# Patient Record
(demographics unavailable — no encounter records)

---

## 2024-10-10 NOTE — REASON FOR VISIT
[Post Op: _________] : a [unfilled] post op visit [Family Member] : family member [FreeTextEntry1] : DATE OF OPERATION:  10/03/2024 PROCEDURE:    Closure of nasomaxillary bony exposure after debridement  Closure of right nasomaxillary soft-tissue defect.  Full-thickness Nasal reconstruction with right alar transposition, skin turnover flap for nasal lining, full-thickness skin graft from right postauricular region to nose for skin coverage  Cheek advancement flap. (5cm x 8cm)

## 2024-10-20 NOTE — REASON FOR VISIT
[Post Op: _________] : a [unfilled] post op visit [Family Member] : family member [FreeTextEntry1] : Skin graft from Right ear.

## 2024-10-20 NOTE — DISCUSSION/SUMMARY
[FreeTextEntry1] : S/P cheek and nasal reconstrucion nonhealing and trauma after surgery nasal turnover flap dehised and kassidy right alar tissue detatched must redo the procedure in OR including flaps and FTSG  Swelling and eccymosis are still present  Advised to sleep with head at 45 degrees to reduce swelling; Advance to regular diet; Resume normal activities;  Take Motrin 600mg with food every 6 hours; Follow up in one week. plan OR revision

## 2024-10-29 NOTE — REASON FOR VISIT
[Post Op: _________] : a [unfilled] post op visit [Family Member] : family member [FreeTextEntry1] : DOS 10/24/24 PROCEDURES:   Right nasal turnover flap.  Reposition of right alar composite graft.  Full-thickness skin graft from left posterior ear to right nasal sidewall with bolster.

## 2024-11-04 NOTE — HISTORY OF PRESENT ILLNESS
[FreeTextEntry1] :  OPERATION: 10/24/2024 PROCEDURES: - Right nasal turnover flap.  -Reposition of right alar composite graft.  -Full-thickness skin graft from left posterior ear to right nasal sidewall with bolster.   Patient accompanied by family member.

## 2024-11-11 NOTE — REASON FOR VISIT
[Post Op: _________] : a [unfilled] post op visit [FreeTextEntry1] : OPERATION: 10/24/2024 PROCEDURES: - Right nasal turnover flap. -Reposition of right alar composite graft. -Full-thickness skin graft from left posterior ear to right nasal sidewall with bolster. Patient accompanied by family member.

## 2024-11-23 NOTE — PHYSICAL EXAM
[de-identified] : Alert, calm, cooperative. [de-identified] : + right nasal tissue contraction.  [de-identified] : Respirations even and unlabored.

## 2024-11-23 NOTE — DISCUSSION/SUMMARY
[FreeTextEntry1] : DOTTY is a 79 year old female patient that presents to the office today with her friend for a post operative evaluation s/p Right nasal turnover flap, Reposition of right alar composite graft, Full-thickness skin graft from left posterior ear to right nasal sidewall with bolster on 10/24/2024 and a revision procedure on 11/7/2024. Instructions reviewed and questions/concerns answered. Clean area daily with soap and water or saline solution, then apply bacitracin to incision sites, then apply a layer of xeroform over the incision sites, then reapply a layer of bacitracin. Telfa and gauze with tape can be used to cover the xeroform to help avoid touching region. Dressing instructions will also be emailed to pt's daughter Elana to assist with wound care.  - Sleep with at least 2 pillows to keep head elevated. - Use Ibuprofen for pain relief. - You may shower and air dry or gently pat dry. - May advance diet as tolerated. - Contact us for any questions or concerns. - Follow up in 1 week.

## 2024-11-23 NOTE — REASON FOR VISIT
[Post Op: _________] : a [unfilled] post op visit [Family Member] : family member [Friend] : friend [FreeTextEntry1] : OPERATION: 10/24/2024 PROCEDURES: - Right nasal turnover flap. -Reposition of right alar composite graft. -Full-thickness skin graft from left posterior ear to right nasal sidewall with bolster. Patient accompanied by family member.

## 2024-11-23 NOTE — PHYSICAL EXAM
[de-identified] : Alert, calm, cooperative. [de-identified] : + right nasal tissue contraction.  [de-identified] : Respirations even and unlabored.

## 2024-11-23 NOTE — HISTORY OF PRESENT ILLNESS
[FreeTextEntry1] : DOTTY is a 79 year old female patient that presents to the office today with her friend for a post operative evaluation s/p Right nasal turnover flap, Reposition of right alar composite graft, Full-thickness skin graft from left posterior ear to right nasal sidewall with bolster on 10/24/2024 and a revision procedure on 11/7/2024. Pt's daughter emailed prior to appt with photo updates which showed some wound dehiscence.

## 2024-11-23 NOTE — PHYSICAL EXAM
[de-identified] : Alert, calm, cooperative. [de-identified] : + right nasal tissue contraction.  [de-identified] : Respirations even and unlabored.

## 2024-12-12 NOTE — PHYSICAL EXAM
[de-identified] : Alert, calm, cooperative. [de-identified] : + turndown flap contraction.  [de-identified] : Respirations even and unlabored.

## 2024-12-12 NOTE — DISCUSSION/SUMMARY
[FreeTextEntry1] : DOTTY is a 79 year old female patient that presents to the office today with her friend for a post operative evaluation s/p Right nasal turnover flap, Reposition of right alar composite graft, Full-thickness skin graft from left posterior ear to right nasal sidewall with bolster on 10/24/2024 and a revision procedure on 11/7/2024. Dr. Bell states that it will be best to wait for the tissue to continue to contract. Instructions reviewed and questions/concerns answered.  - Apply bacitracin to wound sites, pt may wear a bandaid to protect the area when leaving home. - Sleep with at least 2 pillows to keep head elevated. - Use Ibuprofen for pain relief. - You may shower and air dry or gently pat dry. - May advance diet as tolerated. - Contact us for any questions or concerns. - Follow up in 6 weeks.

## 2024-12-12 NOTE — PHYSICAL EXAM
[de-identified] : Alert, calm, cooperative. [de-identified] : + turndown flap contraction.  [de-identified] : Respirations even and unlabored.

## 2024-12-12 NOTE — REASON FOR VISIT
[Post Op: _________] : a [unfilled] post op visit [Family Member] : family member [FreeTextEntry1] : Dop: 10/24/2024 PROCEDURES: - Right nasal turnover flap. -Reposition of right alar composite graft. -Full-thickness skin graft from left posterior ear to right nasal sidewall with bolster.

## 2024-12-12 NOTE — HISTORY OF PRESENT ILLNESS
[FreeTextEntry1] : DOTTY is a 79 year old female patient that presents to the office today with her friend for a post operative evaluation s/p Right nasal turnover flap, Reposition of right alar composite graft, Full-thickness skin graft from left posterior ear to right nasal sidewall with bolster on 10/24/2024 and a revision procedure on 11/7/2024. Pt still with soft tissue contraction at turndown flap.

## 2024-12-12 NOTE — PHYSICAL EXAM
[de-identified] : Alert, calm, cooperative. [de-identified] : + turndown flap contraction.  [de-identified] : Respirations even and unlabored.

## 2025-01-16 NOTE — PHYSICAL EXAM
[de-identified] : Alert, calm, cooperative. [de-identified] : + turndown flap contraction.  [de-identified] : Respirations even and unlabored.

## 2025-01-16 NOTE — DISCUSSION/SUMMARY
[FreeTextEntry1] : DOTTY is a 79 year old female patient that presents to the office today with her friend for a post operative evaluation s/p Right nasal turnover flap, Reposition of right alar composite graft, Full-thickness skin graft from left posterior ear to right nasal sidewall with bolster on 10/24/2024 and a revision procedure on 11/7/2024. Dr. Bell states they can operate Instructions reviewed and questions/concerns answered.  - Apply bacitracin to wound sites, pt may wear a bandaid to protect the area when leaving home. - Sleep with at least 2 pillows to keep head elevated. - Use Ibuprofen for pain relief. - You may shower and gently pat dry. - May advance diet as tolerated. - Contact us for any questions or concerns. - Our office will contact you when procedure is approved.  Plan: Right nasal reconstruction

## 2025-01-16 NOTE — REASON FOR VISIT
[Family Member] : family member [FreeTextEntry1] : Dop: 10/24/2024 PROCEDURES: - Right nasal turnover flap. -Reposition of right alar composite graft. -Full-thickness skin graft from left posterior ear to right nasal sidewall with bolster.   Patient accompanied by family member.

## 2025-01-16 NOTE — PHYSICAL EXAM
[de-identified] : Alert, calm, cooperative. [de-identified] : + turndown flap contraction.  [de-identified] : Respirations even and unlabored.

## 2025-01-16 NOTE — PHYSICAL EXAM
[de-identified] : Alert, calm, cooperative. [de-identified] : + turndown flap contraction.  [de-identified] : Respirations even and unlabored.

## 2025-01-23 NOTE — ASSESSMENT
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 She is Q 3 weekly carboplatin and paclitaxel. s/p Cycle 6 on 9/1/20. Repeat imaging revealed persistent heterogeneous tumor is seen along the roof of the maxillary sinus extending into the ethmoid complex. There is persistent tumor extension into the extraconal space of the right orbit inferiorly as well as mild retro-antral tumor extension. s/p CCRT with weekly Cisplatin completed 12/4/21 Pt subsequently had surgery in December 2020 Path CR Undergone reconstructive procedures and needs many more. Completed HBO x 2 times She had some difficulty tolerating it towards Clinically, pt has PETER Reviewed scans from October 2023 independently and note PETER Will get new scans.  Continue with reconstructive procedures Extensive emotional support was provided. OV in 1 year

## 2025-01-23 NOTE — PHYSICAL EXAM
[Restricted in physically strenuous activity but ambulatory and able to carry out work of a light or sedentary nature] : Status 1- Restricted in physically strenuous activity but ambulatory and able to carry out work of a light or sedentary nature, e.g., light house work, office work [Thrush] : thrush [Normal] : affect appropriate [Mucositis] : no mucositis [de-identified] : enophthalmic [de-identified] : facial defect from surgery

## 2025-01-23 NOTE — REVIEW OF SYSTEMS
[Fatigue] : fatigue [Recent Change In Weight] : ~T recent weight change [Vision Problems] : vision problems [Joint Pain] : joint pain [Dizziness] : dizziness [Difficulty Walking] : difficulty walking [Negative] : Psychiatric [Fever] : no fever [Chills] : no chills [Night Sweats] : no night sweats [Eye Pain] : no eye pain [Red Eyes] : eyes not red [Dry Eyes] : no dryness of the eyes [Dysphagia] : no dysphagia [Loss of Hearing] : no loss of hearing [Odynophagia] : no odynophagia [Mucosal Pain] : no mucosal pain [Chest Pain] : no chest pain [Palpitations] : no palpitations [Shortness Of Breath] : no shortness of breath [Wheezing] : no wheezing [Cough] : no cough [Abdominal Pain] : no abdominal pain [Vomiting] : no vomiting [Constipation] : no constipation [Dysuria] : no dysuria [Skin Rash] : no skin rash [Confused] : no confusion [Fainting] : no fainting [Suicidal] : not suicidal [Proptosis] : no proptosis [Easy Bleeding] : no tendency for easy bleeding [FreeTextEntry2] : lost weight [FreeTextEntry3] : rt eye enophthalmic [FreeTextEntry4] : dry mouth, edentulous, new dentures dont fit [FreeTextEntry9] : joint pains stable  [de-identified] : facial deformities above

## 2025-01-23 NOTE — HISTORY OF PRESENT ILLNESS
[Home] : at home, [unfilled] , at the time of the visit. [Medical Office: ([facility])___] : at the medical office located in  [Verbal consent obtained from patient] : the patient, [unfilled] [Disease: _____________________] : Disease: [unfilled] [T: ___] : T[unfilled] [N: ___] : N[unfilled] [M: ___] : M[unfilled] [AJCC Stage: ____] : AJCC Stage: [unfilled] [de-identified] : 78 year old w with new dx of SNUC referred to med onc.  Brief hx: The pt has had chronic sinus issues, but had a flare-up in the Fall, which continued to get worse. She was treated with multiple courses of abx without any help. Since she started having diplopia and rt eye swelling, she saw Dr. Jami Vargas through a virtual visit, and was recommended to undergo CT scan, This showed multi-spatial aggressive mass centered in the right maxillary sinus with osseous destruction, most characteristic of a malignant sinonasal tumor. Intraorbital extension with right-sided proptosis. Erosion of the cribriform plate without dhruv intracranial extension. Destruction of the posterior right maxillary wall with extension into the pterygopalatine fossa. Extension into and blockage of the right nasal passage. Extensive right sided sinusitis. She was prescribed a course of Augmentin and referred to ENT. She saw Dr. Michelle who recommended PET/CT which showed uptake in the mass and indeterminate level IB node. She underwent bx of the mass which came back as high grade neoplasm with necrosis. IHC pos for Cam 5.2, CK7, TTF-1, p40 (patchy), p63 (patchy), CDX-2 (patchy and weak), and PAX-8 (patchy and weak), but negative for CK20, synaptophysin, chromogranin, CD56, p16, Napsin A and ANSLEY-3. Focally CK5/6 is positive in the tumor. There is no loss of INI-1 ( BAF-47). IHC for NUT was negative.   Pt states she has pressure behind her rt eye with diplopia and some headache. She has nasal congestion and some bleeding from rt nostril since the biopsy. She admits to some weight loss but no other constitutional symptoms  5/12/20: Pt seen via tele-med. Pt has worsening of visual dysfunction and rt sided epistaxis. She is having rt sided headache. She is taking   6/9/2020: Pt presents for follow-up. She is doing well. She has completed her first cycle of chemotherapy with Carbo/taxol which she tolerated well. She experienced some considerable fatigue for a few days but other than that she offers no complaints. She has experienced considerable hair loss. Appetite is good. She reports her taste has returned. Her eye is much less proptotic. Vision remains compromised. No epistaxis. She is off steroid and experiencing some more psoriatic arthritis pain.  Pt seen during IV hydration per her request. She states she had ~36-48 hours of nausea, vomiting and diarrhea following C2. She is feeling much better now. Her only complaint is pain due to her RA. She has pain and swelling to both hands and her knees. She is off all treatment for RA. She states she felt a lot better when she was on the steroid following GK. The pain resumed when the steroid stopped.   6/30/2020: Pt seen in treatment today. She states she is feeling well.. Offers no complaints. Right eye continues to normalize but vision is "split" and affects her balance at times. Denies any further episodes of bleeding (epistaxis). She is scheduled for C3 today.   7/28/20: Patient seen as a followup no telehealth visit today.  Pt feels well. Had a few episodes of loose stool after chemo last week, but non-watery and now resolved. Pt recently when to see ophtho yesterday, and stated that her vision is improved in the right eye. No nerve damage.   9/1/20: Ms. Velasquez presents for follow and treatment. Reports that she is still noticing balance problems, "dizziness" despite being on dexamethasone, reports no improvement. Denies any pre-syncope, vertigo, nausea, vomiting, headache; instead, reports unsteadiness when walking, feels like she is tilting to one side. Has had no falls or head trauma as a result. Denies any new neuropathy (has some numbness and tingling occasionally on the hands and right foot). Has right knee joint swelling and thinks her RA is acting up since her humera was held. Denies any change with dexamethasone, but was given prednisone by her rheumatologist. She was seen by her ophthalmologist who notes an improvement in her right eye swelling. Continues to endorse right eye blurriness, denies diplopia.10/15/2020: Pt returns for follow up and discussion of treatment plan. She is scheduled to start CCRT next week. She will have hearing evaluation same day as radiation start.   10/30/2020: Pt seen in treatment. Tolerating CCRT with weekly cis. Mild occasional nausea otherwise no complaints  11/13/20: Seen in tx room. Pt c/o fatigue. She fell yesterday in rad onc suite- did not hurt herself. She reports fuzzy vision on rt side. Dysgeusia, mucosal pain and weight loss.   11/27/2020: Ms. Velasquez is here for follow up and her chemo appointment. she is currently getting concurrent RT. Pt stated that her taste of food has gotten worse since the start of the RT. Mouth started to be more dry since the last couple of weeks. Reports poor appetite and has lost few pounds in last few weeks. Still has sores in the mouth. She eats soft liquid food, c/o pain with solid food. Gabapentin helps with the pain. Oxycodone/tylenol is not much helpful however she is on small dose of percocet 5/325.  She has numbness in the fingers and toes which is stable.   12/24/20: Doing OK, completed RT. WEight loss, denies pain. Rt eye slightly blurry   1/19/21: Pt has no appetite but trying hard. Maintaining weight (stable weight for 2-3 weeks). Rt facial numbness. Seeing opthalmology next week. Vision in the rt eye seems better. As per family, pt has been having some memory issues. Pt states she was feeling a little dizzy and forgetful after starting MM.   2/18/21: Notes rt eye swelling and dry eye. Maintaining weight. Saw ophthal last week and was given eye drops. VEry dry mouth.   9/8/21: Pt is s/p anterior craniofacial resection of the frontal sinus, ethmoid and the sphenoid sinuses; right total maxillectomy; right neck dissection levels 2 and 3; level 4 right orbital reconstruction with calvarial bone graft; right medial canthopexy in May 2021. Final path showed only inflammation and fibrosis- no residual malignancy. She is s/p maxillary Reconstruction with ALT flap, anterior vestibuloplasty- scheduled for more procedures. She had lost a lot of weight after surgery but is gaining it back. She was recently admitted to [facility] for urosepsis. She remains on miratazipine for appetite stimulation but she feels that this is making her ataxic.   12/2/21: Doing much better. Able to function independently. Had plastic surgeries and just completed abx for cellulitis.   2/9/22: No complaints with regards to cancer. Pt is very upset about how her face looks and the fact that she has no additional procedures scheduled.  6/10/22: Had additional plastic procedures and is feeling better.    1/6/23: There were delays in scheduling procedures due to multiple personal issues. pt is now seeing dental team to have dental extraction/implants. THis will hopefully be followed by plastic procedures on face.   7/21/23:  passed away recently in March. Pt has been doing Ok. Pt completed 20 Hyperbaric oxygen therapy. She feels very tired since then.   10/19/23: pt compelted HBO tx. Notes stability in symptoms. Emotionally, still labile.   1/23/25: Pt has undergoine many more surgeries since last visit. Still scheduled for more reconstruction procedures. notes a bump under the chin- 5-6 months ago- feels like a firm pea-like lump  [de-identified] : Undifferentiated

## 2025-03-27 NOTE — REASON FOR VISIT
[Parent] : parent [Family Member] : family member [FreeTextEntry1] : DOP 03/06/25 nasal reconstruction with alar flap, FTSG, turnover flap.

## 2025-03-27 NOTE — DISCUSSION/SUMMARY
[FreeTextEntry1] : Patient presents 11 days s/p revision of right nasal turnover flap.  Patient denies pain; only some discomfort. She and her daughter, who accompanies the patient and has provided medical history, have endorsed that patient has not manipulated wound and that wound has remained covered. Patient has discontinued all pain medications at this time.  On examination, evidence of moderate edema of right cheek and nose, moderate eschar present, no evidence of infection or necrosis. Wound was cleansed with hydrogen peroxide. Xeroform removed. No suture removal required today.  Patient to maintain head elevation while sleeping/resting. Limit manipulation/touching of wound. Patient and caretaker instructed to use bacitracin twice daily, keep covered, and cleanse with either peroxide and/or mild soap and water daily. Follow up in 1-2 weeks

## 2025-04-22 NOTE — HISTORY OF PRESENT ILLNESS
[de-identified] : Nicole Velasquez is a 79-year-old female who presents to the office for follow-up of her left hip hemiarthroplasty.  Patient was recently in the ER due to some concerns about her wound.  She was placed on Keflex.  CT scan and ultrasound did not show significant hip effusion.  No fevers or chills.

## 2025-04-22 NOTE — DISCUSSION/SUMMARY
[de-identified] : Nicole Velasquez is a 79-year-old female who presents to the office for follow-up of her left hip hemiarthroplasty.  X-rays showed left hip hemiarthroplasty in good position and alignment.  Examination showed no significant pain with left hip range of motion.  There was some proximal wound breakdown.  Discussed with the patient and her daughter the examination and imaging findings.  Discussed the management of patient's left hip at this time.  Patient will continue her Keflex for prophylaxis.  She was given a prescription for diclofenac 50 mg daily for 30 days.  Patient will continue her physical therapy.  Discussed wound care with daily Telfa dressings.  Patient will follow-up in 2 weeks for reevaluation and management.  Patient understanding and in agreement the plan.  All questions answered.  Plan: -Continue Keflex -Diclofenac 50 mg daily for 30 days - Wound care: Daily Telfa dressings - Follow-up in 2 weeks for reevaluation and management

## 2025-04-22 NOTE — PHYSICAL EXAM
[de-identified] : Constitutional:  79 year old female, alert and oriented, cooperative, in no acute distress.  HEENT  NC/AT.  Appearance: symmetric  Neck/Back Straight without deformity or instability.  Good ROM.  Chest/Respiratory  Respiratory effort: no intercostal retractions or use of accessory muscles. Nonlabored Breathing  Mental Status:  Judgment, insight: intact Orientation: oriented to time, place, and person  Neurological: Sensory and Motor are grossly intact throughout  Left Hip Exam: Inspection/Appearance:      Incision with some proximal wound breakdown. No significant drainage.  Tenderness:  	Sacroiliac: Negative  	Greater trochanter: Negative                   DOMENIC Test: Negative                  FADIR Test: Negative   Range of Motion:                 Extension - 0  	Flexion - 90  	IR - 20  	ER - 30  	Abd - 30  	Add - 30   Stability: Normal without instability  Neurologic Exam     Motor intact including 5/5 Extensor Hallucis Longus, 5/5 Flexor Hallucis Longus, 5/5 Tibialis Anterior and 5/5 Gastrocnemius     Sensation Intact to Light Touch including Saphenous, Sural, Superficial Peroneal, Deep Peroneal, Tibial nerve distributions  Vascular Exam     Foot is warm and well perfused with 2+ Dorsalis Pedis Pulse  No pain with range of motion of the right hip or bilateral knees. No lumbar paraspinal muscle tenderness. [de-identified] : XRay:  XRays of the Pelvis (1 View) and Left Hip (2 Views) taken in the office today and reviewed with the patient. XRays demonstrate a Left Hip Hemiarthroplasty in good position and alignment. There is no obvious evidence of fracture, dislocation, osteolysis or loosening. (my personal interpretation) Components: DePuy [place] Cemented

## 2025-06-10 NOTE — PHYSICAL EXAM
[de-identified] : Constitutional:  79 year old female, alert and oriented, cooperative, in no acute distress.  HEENT  NC/AT.  Appearance: symmetric  Neck/Back Straight without deformity or instability.  Good ROM.  Chest/Respiratory  Respiratory effort: no intercostal retractions or use of accessory muscles. Nonlabored Breathing  Mental Status:  Judgment, insight: intact Orientation: oriented to time, place, and person  Neurological: Sensory and Motor are grossly intact throughout  Left Hip Exam: Inspection/Appearance:      Incision well healing.  Tenderness:  	Sacroiliac: Negative  	Greater trochanter: Negative                   DOMENIC Test: Negative                  FADIR Test: Negative   Range of Motion:                 Extension - 0  	Flexion - 90  	IR - 20  	ER - 30  	Abd - 30  	Add - 30   Stability: Normal without instability  Neurologic Exam     Motor intact including 5/5 Extensor Hallucis Longus, 5/5 Flexor Hallucis Longus, 5/5 Tibialis Anterior and 5/5 Gastrocnemius     Sensation Intact to Light Touch including Saphenous, Sural, Superficial Peroneal, Deep Peroneal, Tibial nerve distributions  Vascular Exam     Foot is warm and well perfused with 2+ Dorsalis Pedis Pulse  No pain with range of motion of the right hip or bilateral knees. No lumbar paraspinal muscle tenderness. [de-identified] : XRay:  XRays of the Pelvis (1 View) and Left Hip (2 Views) taken on 4/22/2025. XRays demonstrate a Left Hip Hemiarthroplasty in good position and alignment. There is no obvious evidence of fracture, dislocation, osteolysis or loosening. (my personal interpretation) Components: DePuy [place] Cemented

## 2025-06-10 NOTE — HISTORY OF PRESENT ILLNESS
[de-identified] : 6/10/2025  Nicole Velasquez presents to the office for follow-up of her left hip hemiarthroplasty.  Patient is currently doing well overall.  Her wound is well-healing.  She does not have hip pain.  She is walking well.  4/22/2025 Nicole Velasquez is a 79-year-old female who presents to the office for follow-up of her left hip hemiarthroplasty.  Patient was recently in the ER due to some concerns about her wound.  She was placed on Keflex.  CT scan and ultrasound did not show significant hip effusion.  No fevers or chills.

## 2025-06-10 NOTE — DISCUSSION/SUMMARY
[de-identified] : Nicole Velasquez is a 79-year-old female who presents to the office for follow-up of her left hip hemiarthroplasty.  X-rays showed left hip hemiarthroplasty in good position and alignment.  Examination showed no significant pain with left hip range of motion. Examination showed good hip range of motion. Discussed with the patient the examination and imaging findings. Discussed the management of hip hemiarthroplasty at this time, including physical therapy and home exercises. Patient will continue physical therapy and home exercises. Patient will participate in activities as tolerated. Patient will follow up in 3 months for reevaluation and management. Patient understanding and in agreement with the plan. All questions answered.     Plan: -Physical Therapy -Home Exercises -Activities as Tolerated -Follow up in 3 months for reevaluation and management

## 2025-07-16 NOTE — PHYSICAL EXAM
[de-identified] : Constitutional:  80 year old female, alert and oriented, cooperative, in no acute distress.  HEENT  NC/AT.  Appearance: symmetric  Neck/Back Straight without deformity or instability.  Good ROM.  Chest/Respiratory  Respiratory effort: no intercostal retractions or use of accessory muscles. Nonlabored Breathing  Mental Status:  Judgment, insight: intact Orientation: oriented to time, place, and person  Neurological: Sensory and Motor are grossly intact throughout  Left Hip Exam: Inspection/Appearance:      Incision well healing.  Tenderness:  	Sacroiliac: Negative  	Greater trochanter: Negative                   DOMENIC Test: Negative                  FADIR Test: Negative   Range of Motion:                 Extension - 0  	Flexion - 90  	IR - 20  	ER - 30  	Abd - 30  	Add - 30   Stability: Normal without instability  Neurologic Exam     Motor intact including 5/5 Extensor Hallucis Longus, 5/5 Flexor Hallucis Longus, 5/5 Tibialis Anterior and 5/5 Gastrocnemius     Sensation Intact to Light Touch including Saphenous, Sural, Superficial Peroneal, Deep Peroneal, Tibial nerve distributions  Vascular Exam     Foot is warm and well perfused with 2+ Dorsalis Pedis Pulse  No pain with range of motion of the right hip or bilateral knees. No lumbar paraspinal muscle tenderness. [de-identified] : XRay:  XRays of the Pelvis (1 View) and Left Hip (2 Views) taken on 4/22/2025. XRays demonstrate a Left Hip Hemiarthroplasty in good position and alignment. There is no obvious evidence of fracture, dislocation, osteolysis or loosening. (my personal interpretation) Components: [place] Cemented

## 2025-07-16 NOTE — HISTORY OF PRESENT ILLNESS
[de-identified] : 7/16/2025   6/10/2025  Nicole Velasquez presents to the office for follow-up of her left hip hemiarthroplasty.  Patient is currently doing well overall.  Her wound is well-healing.  She does not have hip pain.  She is walking well.  4/22/2025 Nicole Velasquez is a 79-year-old female who presents to the office for follow-up of her left hip hemiarthroplasty.  Patient was recently in the ER due to some concerns about her wound.  She was placed on Keflex.  CT scan and ultrasound did not show significant hip effusion.  No fevers or chills.

## 2025-07-16 NOTE — DISCUSSION/SUMMARY
[de-identified] : Nicole Velasquez is a 79-year-old female who presents to the office for follow-up of her left hip hemiarthroplasty.  X-rays showed left hip hemiarthroplasty in good position and alignment.  Examination showed no significant pain with left hip range of motion. Examination showed good hip range of motion. Discussed with the patient the examination and imaging findings. Discussed the management of hip hemiarthroplasty at this time, including physical therapy and home exercises. Patient will continue physical therapy and home exercises. Patient will participate in activities as tolerated. Patient will follow up in 3 months for reevaluation and management. Patient understanding and in agreement with the plan. All questions answered.     Plan: -Physical Therapy -Home Exercises -Activities as Tolerated -Follow up in 3 months for reevaluation and management